# Patient Record
Sex: MALE | Race: WHITE | URBAN - METROPOLITAN AREA
[De-identification: names, ages, dates, MRNs, and addresses within clinical notes are randomized per-mention and may not be internally consistent; named-entity substitution may affect disease eponyms.]

---

## 2022-09-19 ENCOUNTER — OFFICE VISIT (OUTPATIENT)
Dept: URBAN - METROPOLITAN AREA CLINIC 107 | Facility: CLINIC | Age: 42
End: 2022-09-19

## 2022-09-26 ENCOUNTER — OFFICE VISIT (OUTPATIENT)
Dept: URBAN - METROPOLITAN AREA CLINIC 113 | Facility: CLINIC | Age: 42
End: 2022-09-26

## 2022-10-07 ENCOUNTER — OFFICE VISIT (OUTPATIENT)
Dept: URBAN - METROPOLITAN AREA CLINIC 107 | Facility: CLINIC | Age: 42
End: 2022-10-07

## 2022-10-07 NOTE — HPI-TODAY'S VISIT:
New patient, Mr. Bedolla, is a 42 year old male  . 8/31/22 presented to [facility] ER with abdominal pain and diarrhea. CT a/p with contrast ordered, negative for acute intra-abdominal pathology, left nephrolithiasis, colonic diverticulosis. Prescribed dicyclomine.  .

## 2022-10-27 ENCOUNTER — OFFICE VISIT (OUTPATIENT)
Dept: URBAN - METROPOLITAN AREA CLINIC 107 | Facility: CLINIC | Age: 42
End: 2022-10-27

## 2022-11-21 ENCOUNTER — OFFICE VISIT (OUTPATIENT)
Dept: URBAN - METROPOLITAN AREA CLINIC 107 | Facility: CLINIC | Age: 42
End: 2022-11-21

## 2022-12-05 ENCOUNTER — LAB OUTSIDE AN ENCOUNTER (OUTPATIENT)
Dept: URBAN - METROPOLITAN AREA CLINIC 107 | Facility: CLINIC | Age: 42
End: 2022-12-05

## 2022-12-05 ENCOUNTER — WEB ENCOUNTER (OUTPATIENT)
Dept: URBAN - METROPOLITAN AREA CLINIC 107 | Facility: CLINIC | Age: 42
End: 2022-12-05

## 2022-12-05 ENCOUNTER — OFFICE VISIT (OUTPATIENT)
Dept: URBAN - METROPOLITAN AREA CLINIC 107 | Facility: CLINIC | Age: 42
End: 2022-12-05
Payer: COMMERCIAL

## 2022-12-05 VITALS
WEIGHT: 202 LBS | RESPIRATION RATE: 20 BRPM | HEIGHT: 68 IN | BODY MASS INDEX: 30.62 KG/M2 | DIASTOLIC BLOOD PRESSURE: 83 MMHG | HEART RATE: 70 BPM | SYSTOLIC BLOOD PRESSURE: 117 MMHG | TEMPERATURE: 97.6 F

## 2022-12-05 DIAGNOSIS — K62.5 BLOOD PER RECTUM: ICD-10-CM

## 2022-12-05 PROCEDURE — 99204 OFFICE O/P NEW MOD 45 MIN: CPT | Performed by: NURSE PRACTITIONER

## 2022-12-05 NOTE — HPI-TODAY'S VISIT:
42-year-old male who was recently hospitalized at [facility] in August 2022 for complaints of abdominal bloating and diarrhea occurring intermittently.  A CT of the abdomen and pelvis with contrast on 8/31/2022 revealed colonic diverticulosis and left nephrolithiasis, otherwise unremarkable examination.  Labs in the ER revealed sodium 137, potassium 4.1, BUN 16, creatinine 0.8, T bili 0.6, AST 20, ALT 18, ALP 65, negative urinalysis, WBC 6.6, hemoglobin 14.5, hematocrit 43.3, MCV 85 and platelets 212. He is having bowel movements once or twice daily.  Consistency of his bowel movements can vary from day-to-day depending on his oral intake.  There is not any abdominal pain, nausea, vomiting or weight loss.  He does see intermittent blood per rectum mostly noted on the tissue associated with proctalgia characterized as a burning sensation.  His significant other provides a picture of what looks like a hole or opening near his anus.  An external rectal examination reveals 2 small posterior ulcerated areas near small external hemorrhoid tags by the anal opening.

## 2023-01-23 ENCOUNTER — OFFICE VISIT (OUTPATIENT)
Dept: URBAN - METROPOLITAN AREA MEDICAL CENTER 40 | Facility: MEDICAL CENTER | Age: 43
End: 2023-01-23

## 2023-01-23 ENCOUNTER — CLAIMS CREATED FROM THE CLAIM WINDOW (OUTPATIENT)
Dept: URBAN - METROPOLITAN AREA MEDICAL CENTER 40 | Facility: MEDICAL CENTER | Age: 43
End: 2023-01-23
Payer: COMMERCIAL

## 2023-01-23 DIAGNOSIS — K52.89 OTHER AND UNSPECIFIED NONINFECTIOUS GASTROENTERITIS AND COLITIS: ICD-10-CM

## 2023-01-23 DIAGNOSIS — K62.5 BLOOD PER RECTUM: ICD-10-CM

## 2023-01-23 PROCEDURE — 45380 COLONOSCOPY AND BIOPSY: CPT | Performed by: INTERNAL MEDICINE

## 2023-02-20 ENCOUNTER — OFFICE VISIT (OUTPATIENT)
Dept: URBAN - METROPOLITAN AREA CLINIC 72 | Facility: CLINIC | Age: 43
End: 2023-02-20

## 2023-02-20 NOTE — HPI-TODAY'S VISIT:
Pleasant 42-year-old here for colonoscopy follow-up.  Last seen 12/5/2022 for abnormal bowel movements and bright red blood per rectum.  Colonoscopy 1/23/2023 revealed diverticulosis throughout minimal in severity.  Perianal lesion 1 mm in size identified described as a skin tag.  Mild erythema of rectal mucosa biopsied and random colon biopsies performed to rule out colitis.  Random colon biopsies unremarkable.  Rectal biopsy minimal focal active colitis self-limited pattern possibly secondary to prep artifact.     Hospitalized at [facility] in August 2022 for complaints of abdominal bloating and diarrhea occurring intermittently.  A CT of the abdomen and pelvis with contrast on 8/31/2022 revealed colonic diverticulosis and left nephrolithiasis, otherwise unremarkable examination.  Labs in the ER revealed sodium 137, potassium 4.1, BUN 16, creatinine 0.8, T bili 0.6, AST 20, ALT 18, ALP 65, negative urinalysis, WBC 6.6, hemoglobin 14.5, hematocrit 43.3, MCV 85 and platelets 212.  Last note:  He is having bowel movements once or twice daily.  Consistency of his bowel movements can vary from day-to-day depending on his oral intake.  There is not any abdominal pain, nausea, vomiting or weight loss.  He does see intermittent blood per rectum mostly noted on the tissue associated with proctalgia characterized as a burning sensation.  His significant other provides a picture of what looks like a hole or opening near his anus.  An external rectal examination reveals 2 small posterior ulcerated areas near small external hemorrhoid tags by the anal opening.

## 2023-05-10 ENCOUNTER — OFFICE VISIT (OUTPATIENT)
Dept: URBAN - METROPOLITAN AREA CLINIC 72 | Facility: CLINIC | Age: 43
End: 2023-05-10

## 2023-05-10 NOTE — HPI-TODAY'S VISIT:
Pleasant 42-year-old here for colonoscopy follow-up.  Last seen 12/5/2022 for abnormal bowel movements and bright red blood per rectum.  Colonoscopy 1/23/2023 revealed diverticulosis throughout minimal in severity.  Perianal lesion 1 mm in size identified described as a skin tag.  Mild erythema of rectal mucosa biopsied and random colon biopsies performed to rule out colitis.  Random colon biopsies unremarkable.  Rectal biopsy minimal focal active colitis self-limited pattern possibly secondary to prep artifact.     Hospitalized at [facility] in August 2022 for complaints of abdominal bloating and diarrhea occurring intermittently.  A CT of the abdomen and pelvis with contrast on 8/31/2022 revealed colonic diverticulosis and left nephrolithiasis, otherwise unremarkable examination.  Labs in the ER revealed sodium 137, potassium 4.1, BUN 16, creatinine 0.8, T bili 0.6, AST 20, ALT 18, ALP 65, negative urinalysis, WBC 6.6, hemoglobin 14.5, hematocrit 43.3, MCV 85 and platelets 212.  Last note:  He is having bowel movements once or twice daily.  Consistency of his bowel movements can vary from day-to-day depending on his oral intake.  There is not any abdominal pain, nausea, vomiting or weight loss.  He does see intermittent blood per rectum mostly noted on the tissue associated with proctalgia characterized as a burning sensation.  His significant other provides a picture of what looks like a hole or opening near his anus.  An external rectal examination reveals 2 small posterior ulcerated areas near small external hemorrhoid tags by the anal opening.

## 2023-05-22 ENCOUNTER — OFFICE VISIT (OUTPATIENT)
Dept: URBAN - METROPOLITAN AREA CLINIC 72 | Facility: CLINIC | Age: 43
End: 2023-05-22

## 2023-05-22 NOTE — HPI-TODAY'S VISIT:
Pleasant 42-year-old here for colonoscopy follow-up.  Last seen 12/5/2022 for abnormal bowel movements and bright red blood per rectum.  Colonoscopy 1/23/2023 revealed diverticulosis throughout minimal in severity.  Perianal lesion 1 mm in size identified described as a skin tag.  Mild erythema of rectal mucosa biopsied and random colon biopsies performed to rule out colitis.  Random colon biopsies unremarkable.  Rectal biopsy minimal focal active colitis self-limited pattern possibly secondary to prep artifact.     Hospitalized at [facility] in August 2022 for complaints of abdominal bloating and diarrhea occurring intermittently.  A CT of the abdomen and pelvis with contrast on 8/31/2022 revealed colonic diverticulosis and left nephrolithiasis, otherwise unremarkable examination.  Labs in the ER revealed sodium 137, potassium 4.1, BUN 16, creatinine 0.8, T bili 0.6, AST 20, ALT 18, ALP 65, negative urinalysis, WBC 6.6, hemoglobin 14.5, hematocrit 43.3, MCV 85 and platelets 212.  Last note:  He is having bowel movements once or twice daily.  Consistency of his bowel movements can vary from day-to-day depending on his oral intake.  There is not any abdominal pain, nausea, vomiting or weight loss.  He does see intermittent blood per rectum mostly noted on the tissue associated with proctalgia characterized as a burning sensation.  His significant other provides a picture of what looks like a hole or opening near his anus.  An external rectal examination reveals 2 small posterior ulcerated areas near small external hemorrhoid tags by the anal opening.

## 2023-06-08 ENCOUNTER — OFFICE VISIT (OUTPATIENT)
Dept: URBAN - METROPOLITAN AREA CLINIC 72 | Facility: CLINIC | Age: 43
End: 2023-06-08
Payer: COMMERCIAL

## 2023-06-08 ENCOUNTER — DASHBOARD ENCOUNTERS (OUTPATIENT)
Age: 43
End: 2023-06-08

## 2023-06-08 VITALS
BODY MASS INDEX: 31.22 KG/M2 | SYSTOLIC BLOOD PRESSURE: 142 MMHG | WEIGHT: 206 LBS | TEMPERATURE: 97.1 F | HEART RATE: 73 BPM | DIASTOLIC BLOOD PRESSURE: 83 MMHG | HEIGHT: 68 IN

## 2023-06-08 DIAGNOSIS — K64.4 ANAL SKIN TAG: ICD-10-CM

## 2023-06-08 DIAGNOSIS — R19.5 LOOSE STOOLS: ICD-10-CM

## 2023-06-08 DIAGNOSIS — K57.30 ACQUIRED DIVERTICULOSIS OF COLON: ICD-10-CM

## 2023-06-08 PROBLEM — 397881000: Status: ACTIVE | Noted: 2023-06-08

## 2023-06-08 PROCEDURE — 99214 OFFICE O/P EST MOD 30 MIN: CPT | Performed by: INTERNAL MEDICINE

## 2023-06-08 NOTE — HPI-TODAY'S VISIT:
Pleasant 42-year-old here for colonoscopy follow-up.  Last seen 12/5/2022 for abnormal bowel movements and bright red blood per rectum.  Colonoscopy 1/23/2023 revealed diverticulosis throughout minimal in severity.  Perianal lesion 1 mm in size identified described as a skin tag.  Mild erythema of rectal mucosa biopsied and random colon biopsies performed to rule out colitis.  Random colon biopsies unremarkable.  Rectal biopsy minimal focal active colitis self-limited pattern possibly secondary to prep artifact.     Hospitalized at [facility] in August 2022 for complaints of abdominal bloating and diarrhea occurring intermittently.  A CT of the abdomen and pelvis with contrast on 8/31/2022 revealed colonic diverticulosis and left nephrolithiasis, otherwise unremarkable examination.  Labs in the ER revealed sodium 137, potassium 4.1, BUN 16, creatinine 0.8, T bili 0.6, AST 20, ALT 18, ALP 65, negative urinalysis, WBC 6.6, hemoglobin 14.5, hematocrit 43.3, MCV 85 and platelets 212.  On interiew today he continues to have some loose bowel movements once or twice daily.  Consistency of his bowel movements can vary from day-to-day depending on his oral intake.  There is not any abdominal pain, nausea, vomiting or weight loss.  He does not see any blood.  No rectal pain. He states his skin tag is not bothering him. Fair